# Patient Record
Sex: FEMALE | Race: WHITE | NOT HISPANIC OR LATINO | Employment: FULL TIME | URBAN - METROPOLITAN AREA
[De-identification: names, ages, dates, MRNs, and addresses within clinical notes are randomized per-mention and may not be internally consistent; named-entity substitution may affect disease eponyms.]

---

## 2018-05-09 ENCOUNTER — OFFICE VISIT CONVERTED (OUTPATIENT)
Dept: INTERNAL MEDICINE | Facility: CLINIC | Age: 43
End: 2018-05-09
Attending: PHYSICIAN ASSISTANT

## 2018-05-17 ENCOUNTER — OFFICE VISIT CONVERTED (OUTPATIENT)
Dept: INTERNAL MEDICINE | Facility: CLINIC | Age: 43
End: 2018-05-17
Attending: INTERNAL MEDICINE

## 2019-02-25 ENCOUNTER — OFFICE VISIT CONVERTED (OUTPATIENT)
Dept: INTERNAL MEDICINE | Facility: CLINIC | Age: 44
End: 2019-02-25
Attending: PHYSICIAN ASSISTANT

## 2019-10-18 ENCOUNTER — OFFICE VISIT CONVERTED (OUTPATIENT)
Dept: INTERNAL MEDICINE | Facility: CLINIC | Age: 44
End: 2019-10-18
Attending: INTERNAL MEDICINE

## 2020-12-02 ENCOUNTER — OFFICE VISIT CONVERTED (OUTPATIENT)
Dept: INTERNAL MEDICINE | Facility: CLINIC | Age: 45
End: 2020-12-02
Attending: STUDENT IN AN ORGANIZED HEALTH CARE EDUCATION/TRAINING PROGRAM

## 2021-05-13 NOTE — PROGRESS NOTES
"   Progress Note      Patient Name: Deepali Carranza   Patient ID: [id number]   Sex: Female   YOB: 1975    Primary Care Provider: Miranda ROY    Visit Date: December 2, 2020    Provider: Cari Alcocer MD   Location: [facility]   Location Address: [address] [place] [address], [address]  [place], [place]  [id number]   Location Phone: [phone]          Chief Complaint  · Cough  · Congestion  · Sore Throat       History Of Present Illness  Deepali Carranza is a 45 year old female who presents for evaluation and treatment of:      Cough, congestion and sore throat:     Started saturday with a sore throat, then led into the next few days with congestions and draining, coughing, sneezing and loosing her voice. Cough is non-productive. Nasal drainage \"green and nasty\". Denies any fevers. Unsure of chills as she is always cold. Denies nausea or vomiting. Endorses dizziness, no falls. Endorses HA, throbbing over mid-face. Denies myalgias. Has been taking dayquill and nightquill at home.       She never gets the flu shot.   Daughter is Nursing student and works as a CNA and has been around covid exposure and lives with mom. Daughter is having a few of the symptoms that pt is having and getting tested today.         Past Medical History  Disease Name Date Onset Notes   Allergic rhinitis 01/29/2015 --    Anxiety --  --    DDD (degenerative disc disease), cervical 07/13/2015 --    Depression --  --    Hypertension, essential --  --    Insomnia, unspecified 05/10/2016 --    Migraine Headaches --  --    Palpitations --  --    Radiculitis 07/13/2015 --    Spondylosis 07/13/2015 --          Medication List  Name Date Started Instructions   Flonase Allergy Relief 50 mcg/actuation nasal spray,suspension 12/02/2020 spray 1 - 2 sprays (50 - 100 mcg) in each nostril by intranasal route once daily as needed for 90 days   omeprazole 40 mg oral capsule,delayed release(/EC) 10/18/2019 take 1 capsule " "(40 mg) by oral route once daily before a meal   OMEPRAZOLE 40MG CAPSULES 2020 TAKE 1 CAPSULE(40 MG) BY MOUTH EVERY DAY BEFORE A MEAL   Prozac 40 mg oral capsule 10/18/2019 take 1 capsule (40 mg) by oral route once daily in the evening for 30 days         Allergy List  Allergen Name Date Reaction Notes   NO KNOWN DRUG ALLERGIES --  --  --        Allergies Reconciled  Family Medical History  Disease Name Relative/Age Notes   Larynx Neoplasm, Malignant  --    Stroke  --    Heart Disease  --    Hypertension  --    Diabetes Mellitus, Type II  --          Reproductive History  Menstrual   Last Menstrual Period: 04/10/2014   Pregnancy Summary   Total Pregnancies: 2 Full Term: 2 Premature: 0   Ab Induced: 0 Ab Spontaneous: 0 Ectopics: 0   Multiples: 0 Livin         Social History  Finding Status Start/Stop Quantity Notes   Alcohol Current some day --/-- 1 glass wine/wk --    Tobacco Current every day 39/-- 1/2 ppd --          Review of Systems  · Constitutional  o Denies  o : fever, fatigue, weight loss, weight gain  · HENT  o Admits  o : lightheadedness, sinus pain, nasal congestion, nasal discharge, sore throat, ear fullness  · Cardiovascular  o Denies  o : lower extremity edema, claudication, chest pressure, palpitations  · Respiratory  o Denies  o : shortness of breath, wheezing, cough, hemoptysis, dyspnea on exertion  · Gastrointestinal  o Denies  o : nausea, vomiting, diarrhea, constipation, abdominal pain  · Integument  o Denies  o : rash      Vitals  Date Time BP Position Site L\R Cuff Size HR RR TEMP (F) WT  HT  BMI kg/m2 BSA m2 O2 Sat FR L/min FiO2 HC       2019 03:35 /86 Sitting    71 - R 16 97.7 149lbs 2oz 5'  0.75\" 28.41 1.7 97 %      10/18/2019 02:49 /80 Sitting    74 - R 16 98.2 146lbs 6oz 5'  0.75\" 27.89 1.69 99 %  21%    2020 11:13 /90 Sitting    86 - R  97.6 144lbs 0oz 5'  0.75\" 27.43 1.67 98 %  21%          Physical Examination  · Constitutional  o Appearance  o : " no acute distress, well-nourished  · Head and Face  o Head  o :   § Inspection  § : atraumatic, normocephalic  · Eyes  o Eyes  o : extraocular movements intact, no scleral icterus, no conjunctival injection  · Ears, Nose, Mouth and Throat  o Ears  o :   § External Ears  § : normal  § Otoscopic Examination  § : bilateral serous otitis media.  o Nose  o :   § Intranasal Exam  § : nasal mucosa inflammation present, nasal passages patent, maxillary sinus tender to exam by percussion on the left, right maxillary and frontal sinus nontender  o Oral Cavity  o :   § Oral Mucosa  § : moist mucous membranes  o Throat  o :   § Oropharynx  § : no inflammation or lesions present, tonsils within normal limits, mild erythema over post pharynx  · Respiratory  o Respiratory Effort  o : breathing comfortably, symmetric chest rise  o Auscultation of Lungs  o : clear to asculatation bilaterally, no wheezes, rales, or rhonchii  · Cardiovascular  o Heart  o :   § Auscultation of Heart  § : regular rate and rhythm, no murmurs, rubs, or gallops  · Neurologic  o Mental Status Examination  o :   § Orientation  § : grossly oriented to person, place and time  o Gait and Station  o :   § Gait Screening  § : normal gait  · Psychiatric  o General  o : normal mood and affect              Assessment  · Cough     786.2/R05  Acute. Secondary to viral illness. Low suspicion for influenza given hx above. Flu screen indeterminate in clinic. Patient declined flu vaccine.    Patient with high risk of exposure to covid given living arrangements as noted above. However symptoms at this time are more concerning for viral sinusitis. Low threshold to screen should symptoms persist or worsen.   · Hypertension, essential     401.9/I10  hx of in the past. Has not been on any meds over the past couple years. BP elevated in office today, prior BP wnl. Encourage patient to check BP at home over the next couple days.   · Sore throat     462/J02.9  Acute and  improving. Strep swab negative in clinic. Most likely secondary to viral pharyngitis. Supportive care.   · Acute sinusitis     461.9/J01.90  Most likely viral in nature. Supportive care with Flonase and Loratadine. Reasons to return to clinic for add' evaluation discussed.     Problems Reconciled  Plan  · Orders  o ACO-39: Current medications updated and reviewed (, 1159F) - - 12/02/2020  · Medications  o Flonase Allergy Relief 50 mcg/actuation nasal spray,suspension   SIG: spray 1 - 2 sprays (50 - 100 mcg) in each nostril by intranasal route once daily for 14 days   DISP: (1) Package with 0 refills  Prescribed on 12/02/2020     o loratadine 10 mg oral tablet   SIG: take 1 tablet (10 mg) by oral route once daily for 30 days   DISP: (30) Tablet with 2 refills  Prescribed on 12/02/2020     o Medications have been Reconciled  o Transition of Care or Provider Policy  · Instructions  o Patient was educated/instructed on their diagnosis, treatment and medications prior to discharge from the clinic today.  · Disposition  o Call or Return if symptoms worsen or persist.            Electronically Signed by: Cari Alcocer MD -Author on December 2, 2020 09:52:01 PM

## 2021-05-14 VITALS
BODY MASS INDEX: 28.27 KG/M2 | HEIGHT: 60 IN | WEIGHT: 144 LBS | SYSTOLIC BLOOD PRESSURE: 142 MMHG | HEART RATE: 86 BPM | OXYGEN SATURATION: 98 % | TEMPERATURE: 97.6 F | DIASTOLIC BLOOD PRESSURE: 90 MMHG

## 2021-05-15 VITALS
RESPIRATION RATE: 16 BRPM | WEIGHT: 146.37 LBS | HEIGHT: 60 IN | DIASTOLIC BLOOD PRESSURE: 80 MMHG | SYSTOLIC BLOOD PRESSURE: 126 MMHG | HEART RATE: 74 BPM | BODY MASS INDEX: 28.74 KG/M2 | TEMPERATURE: 98.2 F | OXYGEN SATURATION: 99 %

## 2021-05-16 VITALS
OXYGEN SATURATION: 98 % | RESPIRATION RATE: 16 BRPM | DIASTOLIC BLOOD PRESSURE: 80 MMHG | HEART RATE: 103 BPM | SYSTOLIC BLOOD PRESSURE: 132 MMHG | WEIGHT: 159.37 LBS | BODY MASS INDEX: 31.29 KG/M2 | TEMPERATURE: 97.2 F | HEIGHT: 60 IN

## 2021-05-16 VITALS
WEIGHT: 161 LBS | RESPIRATION RATE: 16 BRPM | HEIGHT: 60 IN | TEMPERATURE: 98 F | BODY MASS INDEX: 31.61 KG/M2 | HEART RATE: 116 BPM | SYSTOLIC BLOOD PRESSURE: 152 MMHG | OXYGEN SATURATION: 99 % | DIASTOLIC BLOOD PRESSURE: 88 MMHG

## 2021-05-16 VITALS
DIASTOLIC BLOOD PRESSURE: 86 MMHG | TEMPERATURE: 97.7 F | RESPIRATION RATE: 16 BRPM | OXYGEN SATURATION: 97 % | BODY MASS INDEX: 29.28 KG/M2 | WEIGHT: 149.12 LBS | HEART RATE: 71 BPM | HEIGHT: 60 IN | SYSTOLIC BLOOD PRESSURE: 126 MMHG

## 2022-09-30 ENCOUNTER — OFFICE VISIT (OUTPATIENT)
Dept: INTERNAL MEDICINE | Facility: CLINIC | Age: 47
End: 2022-09-30

## 2022-09-30 VITALS
WEIGHT: 142.6 LBS | TEMPERATURE: 96.7 F | OXYGEN SATURATION: 97 % | BODY MASS INDEX: 28 KG/M2 | RESPIRATION RATE: 18 BRPM | HEIGHT: 60 IN | HEART RATE: 91 BPM | SYSTOLIC BLOOD PRESSURE: 128 MMHG | DIASTOLIC BLOOD PRESSURE: 78 MMHG

## 2022-09-30 DIAGNOSIS — N30.00 ACUTE CYSTITIS WITHOUT HEMATURIA: Primary | ICD-10-CM

## 2022-09-30 DIAGNOSIS — N39.46 MIXED INCONTINENCE: ICD-10-CM

## 2022-09-30 DIAGNOSIS — R30.0 DYSURIA: ICD-10-CM

## 2022-09-30 LAB
BACTERIA UR QL AUTO: ABNORMAL /HPF
BILIRUB UR QL STRIP: NEGATIVE
CLARITY UR: ABNORMAL
COLOR UR: ABNORMAL
GLUCOSE UR STRIP-MCNC: ABNORMAL MG/DL
HGB UR QL STRIP.AUTO: NEGATIVE
HYALINE CASTS UR QL AUTO: ABNORMAL /LPF
KETONES UR QL STRIP: NEGATIVE
LEUKOCYTE ESTERASE UR QL STRIP.AUTO: NEGATIVE
NITRITE UR QL STRIP: POSITIVE
PH UR STRIP.AUTO: 6.5 [PH] (ref 5–8)
PROT UR QL STRIP: ABNORMAL
RBC # UR STRIP: ABNORMAL /HPF
REF LAB TEST METHOD: ABNORMAL
SP GR UR STRIP: 1.01 (ref 1–1.03)
SQUAMOUS #/AREA URNS HPF: ABNORMAL /HPF
UNIDENT CRYS URNS QL MICRO: ABNORMAL /HPF
UROBILINOGEN UR QL STRIP: ABNORMAL
WBC # UR STRIP: ABNORMAL /HPF

## 2022-09-30 PROCEDURE — 99213 OFFICE O/P EST LOW 20 MIN: CPT | Performed by: NURSE PRACTITIONER

## 2022-09-30 PROCEDURE — 81001 URINALYSIS AUTO W/SCOPE: CPT | Performed by: NURSE PRACTITIONER

## 2022-09-30 PROCEDURE — 87086 URINE CULTURE/COLONY COUNT: CPT | Performed by: NURSE PRACTITIONER

## 2022-09-30 RX ORDER — SULFAMETHOXAZOLE AND TRIMETHOPRIM 800; 160 MG/1; MG/1
1 TABLET ORAL 2 TIMES DAILY
Qty: 14 TABLET | Refills: 0 | Status: SHIPPED | OUTPATIENT
Start: 2022-09-30 | End: 2022-10-07

## 2022-09-30 NOTE — PROGRESS NOTES
"Chief Complaint  Dysuria (5 days), Urinary Frequency (5 days), and Urinary Urgency (5 days)    Subjective          Deepali Carranza presents to [facility]  History of Present Illness  Dysuria x5 days  Azo today  uti--Occurring about once yearly  Denies fever, chills, body aches    Occasional incontinence worsening over the last few years  Objective   Vital Signs:   /78 (BP Location: Left arm, Patient Position: Sitting, Cuff Size: Adult)   Pulse 91   Temp 96.7 °F (35.9 °C)   Resp 18   Ht 152.4 cm (60\")   Wt 64.7 kg (142 lb 9.6 oz)   SpO2 97%   BMI 27.85 kg/m²     Physical Exam  Vitals and nursing note reviewed.   Constitutional:       General: She is not in acute distress.     Appearance: Normal appearance.   HENT:      Head: Normocephalic and atraumatic.      Right Ear: External ear normal.      Left Ear: External ear normal.      Nose: Nose normal.      Mouth/Throat:      Mouth: Mucous membranes are moist.   Eyes:      Conjunctiva/sclera: Conjunctivae normal.   Cardiovascular:      Rate and Rhythm: Normal rate and regular rhythm.      Pulses: Normal pulses.      Heart sounds: Normal heart sounds. No murmur heard.    No friction rub. No gallop.   Pulmonary:      Effort: Pulmonary effort is normal. No respiratory distress.      Breath sounds: No wheezing, rhonchi or rales.   Abdominal:      Tenderness: There is no abdominal tenderness. There is no right CVA tenderness, left CVA tenderness or guarding.   Musculoskeletal:      Cervical back: Neck supple.   Skin:     General: Skin is warm and dry.   Neurological:      General: No focal deficit present.      Mental Status: She is alert and oriented to person, place, and time.   Psychiatric:         Mood and Affect: Mood normal.         Behavior: Behavior normal.        Result Review :          Procedures      Assessment and Plan    Diagnoses and all orders for this visit:    1. Acute cystitis without " hematuria (Primary)  Comments:  treating with bactrim. sending for micro and cx    2. Dysuria  -     Urinalysis With Culture If Indicated - Urine, Clean Catch  -     Urinalysis, Microscopic Only - Urine, Clean Catch  -     Urine Culture - Urine, Urine, Clean Catch    3. Mixed incontinence  Comments:  discussed pelvic floor therapy. she will begin with kegel's and call for referral if she would like to due pelvic floor therapy    Other orders  -     sulfamethoxazole-trimethoprim (Bactrim DS) 800-160 MG per tablet; Take 1 tablet by mouth 2 (Two) Times a Day for 7 days.  Dispense: 14 tablet; Refill: 0              Follow Up   Return if symptoms worsen or fail to improve.  Patient was given instructions and counseling regarding her condition or for health maintenance advice. Please see specific information pulled into the AVS if appropriate.

## 2022-10-01 LAB — BACTERIA SPEC AEROBE CULT: ABNORMAL

## 2023-07-11 PROBLEM — R00.2 PALPITATIONS: Status: ACTIVE | Noted: 2023-07-11

## 2023-07-11 PROBLEM — I10 HYPERTENSION, ESSENTIAL: Status: ACTIVE | Noted: 2023-07-11

## 2023-07-11 PROBLEM — G43.009 MIGRAINE WITHOUT AURA: Status: ACTIVE | Noted: 2023-07-11

## 2023-07-11 PROBLEM — R21 RASH: Status: ACTIVE | Noted: 2023-07-11

## 2023-07-11 PROBLEM — F32.A DEPRESSION: Status: ACTIVE | Noted: 2023-07-11

## 2023-07-11 PROBLEM — F41.9 ANXIETY: Status: ACTIVE | Noted: 2023-07-11

## 2023-09-25 ENCOUNTER — DOCUMENTATION (OUTPATIENT)
Dept: INTERNAL MEDICINE | Facility: CLINIC | Age: 48
End: 2023-09-25
Payer: COMMERCIAL

## 2023-09-25 ENCOUNTER — OFFICE VISIT (OUTPATIENT)
Dept: INTERNAL MEDICINE | Facility: CLINIC | Age: 48
End: 2023-09-25

## 2023-09-25 VITALS
WEIGHT: 117 LBS | HEART RATE: 68 BPM | OXYGEN SATURATION: 98 % | BODY MASS INDEX: 22.85 KG/M2 | SYSTOLIC BLOOD PRESSURE: 123 MMHG | DIASTOLIC BLOOD PRESSURE: 81 MMHG | TEMPERATURE: 97.7 F

## 2023-09-25 DIAGNOSIS — R53.83 FATIGUE, UNSPECIFIED TYPE: ICD-10-CM

## 2023-09-25 DIAGNOSIS — Z12.31 VISIT FOR SCREENING MAMMOGRAM: ICD-10-CM

## 2023-09-25 DIAGNOSIS — Z12.11 SCREENING FOR COLON CANCER: ICD-10-CM

## 2023-09-25 DIAGNOSIS — Z00.00 ANNUAL PHYSICAL EXAM: Primary | ICD-10-CM

## 2023-09-25 DIAGNOSIS — L81.9 DISCOLORATION OF SKIN OF FOOT: ICD-10-CM

## 2023-09-25 DIAGNOSIS — R25.2 LEG CRAMPING: Primary | ICD-10-CM

## 2023-09-25 DIAGNOSIS — R25.2 LEG CRAMPING: ICD-10-CM

## 2023-09-25 DIAGNOSIS — E87.6 HYPOKALEMIA: ICD-10-CM

## 2023-09-25 DIAGNOSIS — R60.0 LOWER EXTREMITY EDEMA: ICD-10-CM

## 2023-09-25 LAB
25(OH)D3 SERPL-MCNC: 67.1 NG/ML (ref 30–100)
ALBUMIN SERPL-MCNC: 4.4 G/DL (ref 3.5–5.2)
ALBUMIN/GLOB SERPL: 1.6 G/DL
ALP SERPL-CCNC: 39 U/L (ref 39–117)
ALT SERPL W P-5'-P-CCNC: 18 U/L (ref 1–33)
ANION GAP SERPL CALCULATED.3IONS-SCNC: 12.2 MMOL/L (ref 5–15)
AST SERPL-CCNC: 21 U/L (ref 1–32)
BASOPHILS # BLD AUTO: 0.04 10*3/MM3 (ref 0–0.2)
BASOPHILS NFR BLD AUTO: 0.8 % (ref 0–1.5)
BILIRUB SERPL-MCNC: 0.5 MG/DL (ref 0–1.2)
BUN SERPL-MCNC: 10 MG/DL (ref 6–20)
BUN/CREAT SERPL: 11.4 (ref 7–25)
CALCIUM SPEC-SCNC: 9.6 MG/DL (ref 8.6–10.5)
CHLORIDE SERPL-SCNC: 100 MMOL/L (ref 98–107)
CHOLEST SERPL-MCNC: 197 MG/DL (ref 0–200)
CO2 SERPL-SCNC: 27.8 MMOL/L (ref 22–29)
CREAT SERPL-MCNC: 0.88 MG/DL (ref 0.57–1)
DEPRECATED RDW RBC AUTO: 38.4 FL (ref 37–54)
EGFRCR SERPLBLD CKD-EPI 2021: 81.2 ML/MIN/1.73
EOSINOPHIL # BLD AUTO: 0.14 10*3/MM3 (ref 0–0.4)
EOSINOPHIL NFR BLD AUTO: 2.9 % (ref 0.3–6.2)
ERYTHROCYTE [DISTWIDTH] IN BLOOD BY AUTOMATED COUNT: 11.8 % (ref 12.3–15.4)
FERRITIN SERPL-MCNC: 89.9 NG/ML (ref 13–150)
FOLATE SERPL-MCNC: 6.39 NG/ML (ref 4.78–24.2)
GLOBULIN UR ELPH-MCNC: 2.7 GM/DL
GLUCOSE SERPL-MCNC: 88 MG/DL (ref 65–99)
HCT VFR BLD AUTO: 38.9 % (ref 34–46.6)
HDLC SERPL-MCNC: 73 MG/DL (ref 40–60)
HGB BLD-MCNC: 13.5 G/DL (ref 12–15.9)
IMM GRANULOCYTES # BLD AUTO: 0.01 10*3/MM3 (ref 0–0.05)
IMM GRANULOCYTES NFR BLD AUTO: 0.2 % (ref 0–0.5)
IRON 24H UR-MRATE: 108 MCG/DL (ref 37–145)
IRON SATN MFR SERPL: 23 % (ref 20–50)
LDLC SERPL CALC-MCNC: 108 MG/DL (ref 0–100)
LDLC/HDLC SERPL: 1.46 {RATIO}
LYMPHOCYTES # BLD AUTO: 1.29 10*3/MM3 (ref 0.7–3.1)
LYMPHOCYTES NFR BLD AUTO: 26.8 % (ref 19.6–45.3)
MAGNESIUM SERPL-MCNC: 1.9 MG/DL (ref 1.6–2.6)
MCH RBC QN AUTO: 31.5 PG (ref 26.6–33)
MCHC RBC AUTO-ENTMCNC: 34.7 G/DL (ref 31.5–35.7)
MCV RBC AUTO: 90.9 FL (ref 79–97)
MONOCYTES # BLD AUTO: 0.36 10*3/MM3 (ref 0.1–0.9)
MONOCYTES NFR BLD AUTO: 7.5 % (ref 5–12)
NEUTROPHILS NFR BLD AUTO: 2.97 10*3/MM3 (ref 1.7–7)
NEUTROPHILS NFR BLD AUTO: 61.8 % (ref 42.7–76)
NRBC BLD AUTO-RTO: 0 /100 WBC (ref 0–0.2)
PLATELET # BLD AUTO: 381 10*3/MM3 (ref 140–450)
PMV BLD AUTO: 10.2 FL (ref 6–12)
POTASSIUM SERPL-SCNC: 2.5 MMOL/L (ref 3.5–5.2)
PROT SERPL-MCNC: 7.1 G/DL (ref 6–8.5)
RBC # BLD AUTO: 4.28 10*6/MM3 (ref 3.77–5.28)
SODIUM SERPL-SCNC: 140 MMOL/L (ref 136–145)
T4 FREE SERPL-MCNC: 1.38 NG/DL (ref 0.93–1.7)
TIBC SERPL-MCNC: 460 MCG/DL (ref 298–536)
TRANSFERRIN SERPL-MCNC: 309 MG/DL (ref 200–360)
TRIGL SERPL-MCNC: 88 MG/DL (ref 0–150)
TSH SERPL DL<=0.05 MIU/L-ACNC: 0.83 UIU/ML (ref 0.27–4.2)
VIT B12 BLD-MCNC: 549 PG/ML (ref 211–946)
VLDLC SERPL-MCNC: 16 MG/DL (ref 5–40)
WBC NRBC COR # BLD: 4.81 10*3/MM3 (ref 3.4–10.8)

## 2023-09-25 PROCEDURE — 82306 VITAMIN D 25 HYDROXY: CPT | Performed by: NURSE PRACTITIONER

## 2023-09-25 PROCEDURE — 82746 ASSAY OF FOLIC ACID SERUM: CPT | Performed by: NURSE PRACTITIONER

## 2023-09-25 PROCEDURE — 84466 ASSAY OF TRANSFERRIN: CPT | Performed by: NURSE PRACTITIONER

## 2023-09-25 PROCEDURE — 82607 VITAMIN B-12: CPT | Performed by: NURSE PRACTITIONER

## 2023-09-25 PROCEDURE — 83735 ASSAY OF MAGNESIUM: CPT | Performed by: NURSE PRACTITIONER

## 2023-09-25 PROCEDURE — 80061 LIPID PANEL: CPT | Performed by: NURSE PRACTITIONER

## 2023-09-25 PROCEDURE — 80050 GENERAL HEALTH PANEL: CPT | Performed by: NURSE PRACTITIONER

## 2023-09-25 PROCEDURE — 83540 ASSAY OF IRON: CPT | Performed by: NURSE PRACTITIONER

## 2023-09-25 PROCEDURE — 82728 ASSAY OF FERRITIN: CPT | Performed by: NURSE PRACTITIONER

## 2023-09-25 PROCEDURE — 84439 ASSAY OF FREE THYROXINE: CPT | Performed by: NURSE PRACTITIONER

## 2023-09-25 RX ORDER — PHENTERMINE HYDROCHLORIDE 30 MG/1
30 CAPSULE ORAL EVERY MORNING
COMMUNITY

## 2023-09-25 NOTE — PROGRESS NOTES
Chief Complaint  Annual Exam (Concern with feet- tingling in feet, purple and swelling /)    Subjective         Deepali Carranza presents to [facility]  HPI     Last labs: Years   LMP: 9/2023, irregular   PAP: 2016, patient to schedule  Mammogram: 2016; Denies family history of breast cancer  Colonoscopy: Denies family history of colon cancer  Influenza vaccination: Would like   COVID19 vaccination: Up to date  Eye exam: 2023  Dental exam: Due   Smoking history: Denies   Specialists: [facility]      Annual physical exam-  Family history of stroke in paternal aunts. Denies chest pain, blurry vision, headache, leg swelling, shortness of breath, seizure activity.    Patient reports since January she has been working on exercising and weight loss.  Has noticed worsening left hip pain, will have numbness/tingling in the left leg. Admits to episodes of sciatica. States she has also noticed bilateral foot discoloration, states her feet will often turn purple when she sits or stands for significant period of time.  States when she first wakes up in the morning her feet will often be tingling and itchy.  Denies injury, shortness of breath, orthopnea.  States her left calf often feels like it needs to stretch but is not quite a cramp. Denies calf pain or swelling. States her daughter is a nurse and has been concerned about the color of her feet. She has not had labs in some time. She is currently taking phentermine for weight loss. She is wondering if she is in menopause because she has been very fatigued, often has to make herself get up and go. Admits to depression prior to losing weight but does not think this is an issue anymore. Attributes this to working at home and sitting all day.     Objective     Vitals:    09/25/23 1034   BP: 123/81   BP Location: Left arm   Pulse: 68   Temp: 97.7 °F (36.5 °C)   TempSrc: Temporal   SpO2: 98%   Weight:  53.1 kg (117 lb)      Body mass index is 22.85 kg/m².    Wt Readings from Last 3 Encounters:   09/25/23 53.1 kg (117 lb)   07/11/23 53.9 kg (118 lb 12.8 oz)   09/30/22 64.7 kg (142 lb 9.6 oz)     BP Readings from Last 3 Encounters:   09/25/23 123/81   07/11/23 120/66   09/30/22 128/78       BMI is within normal parameters. No other follow-up for BMI required.         Physical Exam  Constitutional:       Appearance: Normal appearance.   HENT:      Head: Normocephalic and atraumatic.      Right Ear: Tympanic membrane normal.      Left Ear: Tympanic membrane normal.      Nose: Nose normal.      Mouth/Throat:      Mouth: Mucous membranes are moist.      Pharynx: Oropharynx is clear.   Eyes:      Extraocular Movements: Extraocular movements intact.      Conjunctiva/sclera: Conjunctivae normal.      Pupils: Pupils are equal, round, and reactive to light.   Neck:      Thyroid: No thyroid mass, thyromegaly or thyroid tenderness.   Cardiovascular:      Rate and Rhythm: Normal rate and regular rhythm.      Heart sounds: Normal heart sounds.   Pulmonary:      Effort: Pulmonary effort is normal.      Breath sounds: Normal breath sounds.   Skin:     General: Skin is warm and dry.   Neurological:      General: No focal deficit present.      Mental Status: She is alert and oriented to person, place, and time.   Psychiatric:         Mood and Affect: Mood normal.         Behavior: Behavior normal.         Thought Content: Thought content normal.        Result Review :   The following data was reviewed by: KIRILL Cotto on 09/25/2023:      Procedures    Assessment and Plan   Diagnoses and all orders for this visit:    1. Annual physical exam (Primary)  Assessment & Plan:  Basic labs in clinic today. Encouraged routine dental and eye exams. Discussed age appropriate immunizations, screenings. Age appropriate handout provided, including information on nutrition and physical activity.      Orders:  -     Comprehensive Metabolic  Panel  -     CBC & Differential  -     TSH  -     Lipid Panel  -     T4, Free    2. Discoloration of skin of foot  Assessment & Plan:  Not apparent on exam. Pulses normal bilateral feet. Due to persistent symptoms will schedule for FIFI to evaluate circulation. Routine labs today. Will determine further plan of care based on results. She should notify clinic if symptoms worsen or persist.     Orders:  -     US Ankle / Brachial Indices Extremity Complete; Future    3. Lower extremity edema  -     US Ankle / Brachial Indices Extremity Complete; Future    4. Leg cramping    5. Fatigue, unspecified type  Comments:  Labs today. Scheduling for routine screenings.  Orders:  -     TSH  -     T4, Free  -     Magnesium  -     Iron Profile  -     Ferritin  -     Vitamin D,25-Hydroxy  -     Vitamin B12 & Folate    6. Visit for screening mammogram  Comments:  Mammogram ordered.  Orders:  -     Mammo Screening Digital Tomosynthesis Bilateral With CAD; Future    7. Screening for colon cancer  Comments:  Cologuard ordered. Patient aware positive results would warrant further evaluation with colonoscopy.  Orders:  -     Cologuard - Stool, Per Rectum; Future    Other orders  -     Fluzone >6 Months ([id number])          Follow Up   Return if symptoms worsen or fail to improve.  Patient was given instructions and counseling regarding her condition or for health maintenance advice. Please see specific information pulled into the AVS if appropriate.

## 2023-09-25 NOTE — ASSESSMENT & PLAN NOTE
Not apparent on exam. Pulses normal bilateral feet. Due to persistent symptoms will schedule for FIFI to evaluate circulation. Routine labs today. Will determine further plan of care based on results. She should notify clinic if symptoms worsen or persist.

## 2023-09-25 NOTE — PROGRESS NOTES
Called with a critically low potassium called patient she is asymptomatic therefore likely lab error. However told her to hold her phentermine and she will have labs repeated first thing tomorrow. Order placed for BMP

## 2023-09-26 ENCOUNTER — CLINICAL SUPPORT (OUTPATIENT)
Dept: INTERNAL MEDICINE | Facility: CLINIC | Age: 48
End: 2023-09-26
Payer: COMMERCIAL

## 2023-09-26 DIAGNOSIS — R60.0 LOWER EXTREMITY EDEMA: ICD-10-CM

## 2023-09-26 DIAGNOSIS — L81.9 DISCOLORATION OF SKIN OF FOOT: Primary | ICD-10-CM

## 2023-09-26 DIAGNOSIS — Z01.89 ENCOUNTER FOR LABORATORY TEST: Primary | ICD-10-CM

## 2023-09-26 LAB
ANION GAP SERPL CALCULATED.3IONS-SCNC: 10.9 MMOL/L (ref 5–15)
BUN SERPL-MCNC: 12 MG/DL (ref 6–20)
BUN/CREAT SERPL: 14 (ref 7–25)
CALCIUM SPEC-SCNC: 9.4 MG/DL (ref 8.6–10.5)
CHLORIDE SERPL-SCNC: 100 MMOL/L (ref 98–107)
CO2 SERPL-SCNC: 29.1 MMOL/L (ref 22–29)
CREAT SERPL-MCNC: 0.86 MG/DL (ref 0.57–1)
EGFRCR SERPLBLD CKD-EPI 2021: 83.5 ML/MIN/1.73
GLUCOSE SERPL-MCNC: 85 MG/DL (ref 65–99)
POTASSIUM SERPL-SCNC: 2.4 MMOL/L (ref 3.5–5.2)
SODIUM SERPL-SCNC: 140 MMOL/L (ref 136–145)

## 2023-09-26 PROCEDURE — 36415 COLL VENOUS BLD VENIPUNCTURE: CPT | Performed by: INTERNAL MEDICINE

## 2023-09-26 PROCEDURE — 80048 BASIC METABOLIC PNL TOTAL CA: CPT | Performed by: INTERNAL MEDICINE

## 2023-09-26 NOTE — PROGRESS NOTES
Venipuncture Blood Specimen Collection  Venipuncture performed in [facility] by Deepali Peres RN with good hemostasis. Patient tolerated the procedure well without complications.   09/26/23   Deepali Peres RN

## 2023-09-27 ENCOUNTER — DOCUMENTATION (OUTPATIENT)
Dept: INTERNAL MEDICINE | Facility: CLINIC | Age: 48
End: 2023-09-27
Payer: COMMERCIAL

## 2023-09-27 DIAGNOSIS — E87.6 HYPOKALEMIA: Primary | ICD-10-CM

## 2023-09-27 RX ORDER — POTASSIUM CHLORIDE 20 MEQ/1
TABLET, EXTENDED RELEASE ORAL
Qty: 180 TABLET | OUTPATIENT
Start: 2023-09-27

## 2023-09-27 RX ORDER — POTASSIUM CHLORIDE 20 MEQ/1
20 TABLET, EXTENDED RELEASE ORAL 2 TIMES DAILY
Qty: 60 TABLET | Refills: 1 | Status: SHIPPED | OUTPATIENT
Start: 2023-09-27

## 2023-09-27 NOTE — PROGRESS NOTES
Returning lab call re: critical results. Potassium of 2.4 (2.5 yesterday) with normal kidney function and Mg. Attempted to call patient, call went to voicemail. Sending info to pcp.

## 2023-10-04 ENCOUNTER — CLINICAL SUPPORT (OUTPATIENT)
Dept: INTERNAL MEDICINE | Facility: CLINIC | Age: 48
End: 2023-10-04
Payer: COMMERCIAL

## 2023-10-04 DIAGNOSIS — E87.6 HYPOKALEMIA: ICD-10-CM

## 2023-10-04 LAB
ANION GAP SERPL CALCULATED.3IONS-SCNC: 10.6 MMOL/L (ref 5–15)
BUN SERPL-MCNC: 10 MG/DL (ref 6–20)
BUN/CREAT SERPL: 12.8 (ref 7–25)
CALCIUM SPEC-SCNC: 8.9 MG/DL (ref 8.6–10.5)
CHLORIDE SERPL-SCNC: 105 MMOL/L (ref 98–107)
CO2 SERPL-SCNC: 25.4 MMOL/L (ref 22–29)
CREAT SERPL-MCNC: 0.78 MG/DL (ref 0.57–1)
EGFRCR SERPLBLD CKD-EPI 2021: 93.8 ML/MIN/1.73
GLUCOSE SERPL-MCNC: 73 MG/DL (ref 65–99)
POTASSIUM SERPL-SCNC: 3.6 MMOL/L (ref 3.5–5.2)
SODIUM SERPL-SCNC: 141 MMOL/L (ref 136–145)

## 2023-10-04 PROCEDURE — 36415 COLL VENOUS BLD VENIPUNCTURE: CPT | Performed by: INTERNAL MEDICINE

## 2023-10-04 PROCEDURE — 80048 BASIC METABOLIC PNL TOTAL CA: CPT | Performed by: NURSE PRACTITIONER

## 2023-10-04 NOTE — PROGRESS NOTES
Venipuncture Blood Specimen Collection  Venipuncture performed in [facility] by Deepali Peres RN with good hemostasis. Patient tolerated the procedure well without complications.   10/04/23   Deepali Peres RN

## 2023-10-31 ENCOUNTER — HOSPITAL ENCOUNTER (OUTPATIENT)
Dept: CARDIOLOGY | Facility: HOSPITAL | Age: 48
Discharge: HOME | End: 2023-10-31
Admitting: NURSE PRACTITIONER
Payer: COMMERCIAL

## 2023-10-31 DIAGNOSIS — R60.0 LOWER EXTREMITY EDEMA: ICD-10-CM

## 2023-10-31 DIAGNOSIS — L81.9 DISCOLORATION OF SKIN OF FOOT: ICD-10-CM

## 2023-10-31 LAB
BH CV LOWER ARTERIAL LEFT ABI RATIO: 1.13
BH CV LOWER ARTERIAL LEFT DORSALIS PEDIS SYS MAX: 137
BH CV LOWER ARTERIAL LEFT GREAT TOE SYS MAX: 116
BH CV LOWER ARTERIAL LEFT POST TIBIAL SYS MAX: 140
BH CV LOWER ARTERIAL LEFT TBI RATIO: 0.94
BH CV LOWER ARTERIAL RIGHT ABI RATIO: 1.15
BH CV LOWER ARTERIAL RIGHT DORSALIS PEDIS SYS MAX: 139
BH CV LOWER ARTERIAL RIGHT GREAT TOE SYS MAX: 124
BH CV LOWER ARTERIAL RIGHT POST TIBIAL SYS MAX: 142
BH CV LOWER ARTERIAL RIGHT TBI RATIO: 1
UPPER ARTERIAL LEFT ARM BRACHIAL SYS MAX: 124
UPPER ARTERIAL RIGHT ARM BRACHIAL SYS MAX: 122

## 2023-10-31 PROCEDURE — 93922 UPR/L XTREMITY ART 2 LEVELS: CPT

## 2023-10-31 PROCEDURE — 93922 UPR/L XTREMITY ART 2 LEVELS: CPT | Performed by: SURGERY

## 2023-12-15 ENCOUNTER — HOSPITAL ENCOUNTER (OUTPATIENT)
Dept: MAMMOGRAPHY | Facility: HOSPITAL | Age: 48
Discharge: HOME | End: 2023-12-15
Admitting: NURSE PRACTITIONER
Payer: COMMERCIAL

## 2023-12-15 DIAGNOSIS — Z12.31 VISIT FOR SCREENING MAMMOGRAM: ICD-10-CM

## 2023-12-15 PROCEDURE — 77067 SCR MAMMO BI INCL CAD: CPT

## 2023-12-15 PROCEDURE — 77063 BREAST TOMOSYNTHESIS BI: CPT

## 2024-09-30 ENCOUNTER — OFFICE VISIT (OUTPATIENT)
Dept: INTERNAL MEDICINE | Facility: CLINIC | Age: 49
End: 2024-09-30
Payer: COMMERCIAL

## 2024-09-30 VITALS
BODY MASS INDEX: 21.6 KG/M2 | HEART RATE: 76 BPM | WEIGHT: 110 LBS | HEIGHT: 60 IN | OXYGEN SATURATION: 98 % | TEMPERATURE: 97.9 F | DIASTOLIC BLOOD PRESSURE: 70 MMHG | SYSTOLIC BLOOD PRESSURE: 122 MMHG

## 2024-09-30 DIAGNOSIS — R30.0 DYSURIA: Primary | ICD-10-CM

## 2024-09-30 LAB
BACTERIA UR QL AUTO: ABNORMAL /HPF
BILIRUB UR QL STRIP: NEGATIVE
CLARITY UR: CLEAR
COLOR UR: YELLOW
GLUCOSE UR STRIP-MCNC: NEGATIVE MG/DL
HGB UR QL STRIP.AUTO: NEGATIVE
HYALINE CASTS UR QL AUTO: ABNORMAL /LPF
KETONES UR QL STRIP: NEGATIVE
LEUKOCYTE ESTERASE UR QL STRIP.AUTO: NEGATIVE
NITRITE UR QL STRIP: POSITIVE
PH UR STRIP.AUTO: 7 [PH] (ref 5–8)
PROT UR QL STRIP: NEGATIVE
RBC # UR STRIP: ABNORMAL /HPF
REF LAB TEST METHOD: ABNORMAL
SP GR UR STRIP: 1.01 (ref 1–1.03)
SQUAMOUS #/AREA URNS HPF: ABNORMAL /HPF
UROBILINOGEN UR QL STRIP: ABNORMAL
WBC # UR STRIP: ABNORMAL /HPF

## 2024-09-30 PROCEDURE — 87086 URINE CULTURE/COLONY COUNT: CPT | Performed by: PHYSICIAN ASSISTANT

## 2024-09-30 PROCEDURE — 99213 OFFICE O/P EST LOW 20 MIN: CPT | Performed by: PHYSICIAN ASSISTANT

## 2024-09-30 PROCEDURE — 81001 URINALYSIS AUTO W/SCOPE: CPT | Performed by: PHYSICIAN ASSISTANT

## 2024-09-30 RX ORDER — SULFAMETHOXAZOLE/TRIMETHOPRIM 800-160 MG
1 TABLET ORAL 2 TIMES DAILY
Qty: 20 TABLET | Refills: 0 | Status: SHIPPED | OUTPATIENT
Start: 2024-09-30 | End: 2024-10-10

## 2024-09-30 NOTE — ASSESSMENT & PLAN NOTE
Nitrite + in office on urinalysis, will treat empirically for UTI with Bactrim and send urine for culture.  If symptoms persist or worsen, especially if patient develops fevers, hematuria or low back/pelvic pain.  Call for any questions or concerns.

## 2024-09-30 NOTE — PROGRESS NOTES
"Chief Complaint  Urinary Tract Infection (Started Wednesday of last week /Pt has burning pt has been taking uristat and seems to be helping but she wants to be sure )    Subjective        Deepali Carranza presents to [facility]  History of Present Illness    Dysuria: started around 9/25. Initially was having dysuria, frequency, and urgency. She has been taking Uristat and her symptoms have improved. No fevers or hematuria.  Initially she had some lower back pain but after pushing fluids that has resolved.    Objective   Vital Signs:  /70   Pulse 76   Temp 97.9 °F (36.6 °C)   Ht 152.4 cm (60\")   Wt 49.9 kg (110 lb)   SpO2 98%   BMI 21.48 kg/m²   Estimated body mass index is 21.48 kg/m² as calculated from the following:    Height as of this encounter: 152.4 cm (60\").    Weight as of this encounter: 49.9 kg (110 lb).    BMI is within normal parameters. No other follow-up for BMI required.      Physical Exam  Vitals reviewed.   Constitutional:       Appearance: Normal appearance. She is well-developed.   HENT:      Head: Normocephalic and atraumatic.   Eyes:      Conjunctiva/sclera: Conjunctivae normal.      Pupils: Pupils are equal, round, and reactive to light.   Cardiovascular:      Rate and Rhythm: Normal rate and regular rhythm.      Heart sounds: No murmur heard.     No friction rub. No gallop.   Pulmonary:      Effort: Pulmonary effort is normal.      Breath sounds: Normal breath sounds. No wheezing or rhonchi.   Abdominal:      Palpations: Abdomen is soft.      Tenderness: There is no abdominal tenderness. There is no right CVA tenderness or left CVA tenderness.   Skin:     General: Skin is warm and dry.   Neurological:      Mental Status: She is alert and oriented to person, place, and time.      Cranial Nerves: No cranial nerve deficit.   Psychiatric:         Mood and Affect: Mood and affect normal.         Behavior: Behavior normal.         " Thought Content: Thought content normal.         Judgment: Judgment normal.        Result Review :                Assessment and Plan   Diagnoses and all orders for this visit:    1. Dysuria (Primary)  Assessment & Plan:  Nitrite + in office on urinalysis, will treat empirically for UTI with Bactrim and send urine for culture.  If symptoms persist or worsen, especially if patient develops fevers, hematuria or low back/pelvic pain.  Call for any questions or concerns.    Orders:  -     Urinalysis With Culture If Indicated - Urine, Clean Catch  -     Urinalysis, Microscopic Only - Urine, Clean Catch  -     Urine Culture - Urine, Urine, Clean Catch    Other orders  -     sulfamethoxazole-trimethoprim (Bactrim DS) 800-160 MG per tablet; Take 1 tablet by mouth 2 (Two) Times a Day for 10 days.  Dispense: 20 tablet; Refill: 0             Follow Up   No follow-ups on file.  Patient was given instructions and counseling regarding her condition or for health maintenance advice. Please see specific information pulled into the AVS if appropriate.

## 2024-10-01 LAB — BACTERIA SPEC AEROBE CULT: NO GROWTH

## 2025-07-07 ENCOUNTER — OFFICE VISIT (OUTPATIENT)
Dept: INTERNAL MEDICINE | Facility: CLINIC | Age: 50
End: 2025-07-07
Payer: COMMERCIAL

## 2025-07-07 VITALS
HEART RATE: 91 BPM | SYSTOLIC BLOOD PRESSURE: 136 MMHG | OXYGEN SATURATION: 99 % | BODY MASS INDEX: 23.6 KG/M2 | WEIGHT: 120.2 LBS | DIASTOLIC BLOOD PRESSURE: 88 MMHG | TEMPERATURE: 98.8 F | HEIGHT: 60 IN | RESPIRATION RATE: 16 BRPM

## 2025-07-07 DIAGNOSIS — N95.1 PERIMENOPAUSE: ICD-10-CM

## 2025-07-07 DIAGNOSIS — R21 RASH AND NONSPECIFIC SKIN ERUPTION: Primary | ICD-10-CM

## 2025-07-07 PROCEDURE — 99213 OFFICE O/P EST LOW 20 MIN: CPT | Performed by: PHYSICIAN ASSISTANT

## 2025-07-07 RX ORDER — CLOTRIMAZOLE 1 %
1 CREAM (GRAM) TOPICAL 2 TIMES DAILY
Qty: 60 G | Refills: 1 | Status: SHIPPED | OUTPATIENT
Start: 2025-07-07

## 2025-07-07 RX ORDER — FLUCONAZOLE 150 MG/1
150 TABLET ORAL WEEKLY
Qty: 2 TABLET | Refills: 0 | Status: SHIPPED | OUTPATIENT
Start: 2025-07-07

## 2025-07-07 NOTE — ASSESSMENT & PLAN NOTE
Will send to GYN for cervical cancer screening and hormone replacement discussion.  Offered antidepressant and blood work for fatigue but patient wants to hold off at this time.

## 2025-07-07 NOTE — ASSESSMENT & PLAN NOTE
Concerning for tinea pedis, will treat with topical antifungal and oral antifungal.  If symptoms persist or worsen over the next couple weeks will place referral to Dermatology.

## 2025-07-07 NOTE — PROGRESS NOTES
"Chief Complaint  Rash (Rash on right foot, underneath foot, in between toes and around both sides. ) and hormones (Would like recommendations for kirsten menopausal, struggling with hormones. )    Subjective          Deepali Carranza presents to [facility]    Rash on right foot- symptoms present for the past couple months.  It seems to be spreading more.  She has used cortisone cream with some improvement with the itching but the rash has persisted.      Perimenopause- most noticeably is having difficulty with fatigue- difficulty functioning, tearfulness, anxiety/depression, brain fog, mood swings, no libido- symptoms present for 2-3 years.  Symptoms typically are worse about 10-14 days prior to her periods which are irregular.  She is interested in exploring options for hormone replacement.     Objective   Vital Signs:   /88 (BP Location: Left arm, Patient Position: Sitting, Cuff Size: Adult)   Pulse 91   Temp 98.8 °F (37.1 °C) (Temporal)   Resp 16   Ht 152.4 cm (60\")   Wt 54.5 kg (120 lb 3.2 oz)   SpO2 99%   BMI 23.47 kg/m²     Physical Exam  Vitals reviewed.   Constitutional:       Appearance: Normal appearance. She is well-developed.   HENT:      Head: Normocephalic and atraumatic.   Eyes:      Conjunctiva/sclera: Conjunctivae normal.      Pupils: Pupils are equal, round, and reactive to light.   Cardiovascular:      Rate and Rhythm: Normal rate and regular rhythm.      Heart sounds: No murmur heard.     No friction rub. No gallop.   Pulmonary:      Effort: Pulmonary effort is normal.      Breath sounds: Normal breath sounds. No wheezing or rhonchi.   Skin:     General: Skin is warm and dry.      Findings: Rash (erythematous patches on top of right foot along sides and between toes) present.   Neurological:      Mental Status: She is alert and oriented to person, place, and time.      Cranial Nerves: No cranial nerve deficit.   Psychiatric:         " Mood and Affect: Mood and affect normal.         Behavior: Behavior normal.         Thought Content: Thought content normal.         Judgment: Judgment normal.        Result Review :          Procedures      Assessment and Plan    Diagnoses and all orders for this visit:    1. Rash and nonspecific skin eruption (Primary)  Assessment & Plan:  Concerning for tinea pedis, will treat with topical antifungal and oral antifungal.  If symptoms persist or worsen over the next couple weeks will place referral to Dermatology.        2. Perimenopause  Assessment & Plan:  Will send to GYN for cervical cancer screening and hormone replacement discussion.  Offered antidepressant and blood work for fatigue but patient wants to hold off at this time.     Orders:  -     Cancel: Ambulatory Referral to Gynecology  -     Ambulatory Referral to Gynecology    Other orders  -     clotrimazole (LOTRIMIN) 1 % cream; Apply 1 Application topically to the appropriate area as directed 2 (Two) Times a Day.  Dispense: 60 g; Refill: 1  -     fluconazole (Diflucan) 150 MG tablet; Take 1 tablet by mouth 1 (One) Time Per Week.  Dispense: 2 tablet; Refill: 0              Follow Up   No follow-ups on file.  Patient was given instructions and counseling regarding her condition or for health maintenance advice. Please see specific information pulled into the AVS if appropriate.

## 2025-07-16 NOTE — PROGRESS NOTES
GYN new patient    CC: perimenopause    Tobacco/Nicotine use:  No, does Vape    HPI:   50 y.o. Contraception or HRT: Contraception:  Vasectomy   Menses increasingly sporadic  History of Present Illness  The patient presents for hormone replacement therapy.    She was referred for hormone replacement therapy due to physical and mental health issues, which she attributes to perimenopause. She has been monitoring her symptoms for the past 2 years. Her menstrual cycles have been irregular for approximately 5 to 6 years, with variations in timing and flow. She reports significant mood swings, feeling euphoric one day and isolated the next. She experiences anxiety and panic attacks, particularly in crowded places or heavy traffic. She also reports brain fog and a lack of interest in activities she previously enjoyed. She has lost interest in sex but does not experience pain during intercourse.  She does describe responsive desire.  She has a history of frequent urinary tract infections and experiences urinary leakage when walking on concrete. She was advised to consider antidepressants during a recent doctor's visit, but she does not believe her current symptoms are related to depression. She has noticed increased hip and knee pain over the past few years, as well as numbness, tingling, and swelling in her hands, particularly after heavy use such as painting. She also experiences redness and heat in her joints when using a mouse.    GYNECOLOGICAL HISTORY:  - Gynecology History discussed  - Cycle length: Irregular  - Frequency and flow: Sporadic, sometimes lasting 2 days, sometimes a week of spotting    CONTRACEPTION:  - Partner has had a vasectomy  - Menopausal status: Perimenopausal    OBSTETRICAL HISTORY:  - Obstetrics History discussed    SOCIAL HISTORY  She does not smoke but vapes.   History: PMHx, Meds, Allergies, PSHx, Social Hx, and POBHx all reviewed and updated.  PCP:Cely Randolph APRN      Review of  "Systems     /75   Pulse 98   Ht 152.4 cm (60\")   Wt 54.4 kg (120 lb)   LMP 06/17/2025 (Approximate) Comment: Irregular menses  Breastfeeding No   BMI 23.44 kg/m²     Physical Exam  Vitals and nursing note reviewed.   Constitutional:       Appearance: Normal appearance.   Cardiovascular:      Rate and Rhythm: Normal rate and regular rhythm.   Pulmonary:      Effort: Pulmonary effort is normal.   Abdominal:      General: Abdomen is flat.      Palpations: Abdomen is soft.   Neurological:      Mental Status: She is alert.         ASSESSMENT AND PLAN:  Problem Visit    Diagnoses and all orders for this visit:    1. Perimenopause (Primary)  -     estradiol (Minivelle) 0.05 MG/24HR patch; Place 1 patch on the skin as directed by provider 2 (Two) Times a Week.  Dispense: 24 patch; Refill: 3  -     Progesterone (Prometrium) 100 MG capsule; Take 1 capsule by mouth Daily.  Dispense: 90 capsule; Refill: 3    2. Genitourinary syndrome of menopause  -     estradiol (ESTRACE VAGINAL) 0.1 MG/GM vaginal cream; Place 0.5 gm PV and massage 0.5 gm to vulva and vestibule at night 2x/week  Dispense: 42.5 g; Refill: 6    3. Osteoporosis screening  -     DEXA Bone Density Axial; Future    4. Decreased libido  Assessment & Plan:  Psychological condition is newly identified.  Medication changes per orders.  Psychological condition  will be reassessed in 3 months.          Assessment & Plan  1. Perimenopause.  - Symptoms include irregular periods, mood swings, anxiety, panic attacks, chest palpitations, joint pain, and decreased libido.  - The potential benefits and risks of hormone therapy were discussed. A bone density test will be conducted due to her petite stature and history of smoking, which increases her risk of bone loss.  - A medium dose of estrogen and progesterone will be started. Vaginal estrogen cream will be applied twice weekly on patch change days, with instructions to massage it into the bottom third of the " vagina and the external genitalia. Progesterone will be taken nightly at bedtime. If her libido does not improve, testosterone therapy may be considered.  -Patient was counseled at length regarding clinical diagnosis of perimenopause and menopause. We discussed the menopause Society 2022 position statement on treating menopause with MHT. We reviewed the WHI from 2002 and the changes and recommendations and guidelines since that study was published. We discussed the use of transdermal estrogen and its significantly decreased risk of VTE. We discussed how bioidentical is a marketing term and transdermal estradiol is iso-molecular to the estradiol we make naturally.  And it is FDA approved.  Prometrium is also iso-molecular and FDA approved.  Progesterone is important to protect the endometrium from unopposed estrogen with an intact uterus      2. Anxiety.  - Significant anxiety, including panic attacks and chest palpitations, was reported.  - The relationship between her anxiety and perimenopause was discussed. Hormone therapy is expected to help alleviate these symptoms.  - If anxiety persists, additional treatments such as exercise, meditation, antidepressants/antianxiety medication, or therapy may be considered.    3. Depression.  - History of depression and current mood swings and feelings of isolation were reported.  - The potential impact of hormone therapy on her mood was discussed.  - If her mental health does not improve with hormone therapy, other interventions such as antidepressants, exercise, or therapy may be considered.    4. Joint pain.  - Joint pain, particularly in her hands, was reported.  - The relationship between joint pain and perimenopause was discussed.  - Hormone therapy is expected to help with this symptom. If joint pain persists, further evaluation for arthritis may be necessary.    5. Decreased libido.  - Lack of sex drive was reported.  - The relationship between decreased libido and  perimenopause was discussed.  - Hormone therapy is expected to help with this symptom. Vaginal estrogen cream will also be used to address any dryness and discomfort. If her libido does not improve, testosterone therapy may be considered.    Follow-up: A follow-up appointment is scheduled for 3 months from now.         Follow Up:  Return in about 3 months (around 10/17/2025).        Kathy Nolen MD  07/17/2025    Patient or patient representative verbalized consent for the use of Ambient Listening during the visit with  Kathy Nolen MD for chart documentation. 7/17/2025  13:58 EDT

## 2025-07-17 ENCOUNTER — OFFICE VISIT (OUTPATIENT)
Dept: OBSTETRICS AND GYNECOLOGY | Age: 50
End: 2025-07-17
Payer: COMMERCIAL

## 2025-07-17 VITALS
DIASTOLIC BLOOD PRESSURE: 75 MMHG | HEART RATE: 98 BPM | HEIGHT: 60 IN | SYSTOLIC BLOOD PRESSURE: 110 MMHG | BODY MASS INDEX: 23.56 KG/M2 | WEIGHT: 120 LBS

## 2025-07-17 DIAGNOSIS — R68.82 DECREASED LIBIDO: ICD-10-CM

## 2025-07-17 DIAGNOSIS — Z13.820 OSTEOPOROSIS SCREENING: ICD-10-CM

## 2025-07-17 DIAGNOSIS — N95.1 PERIMENOPAUSE: Primary | ICD-10-CM

## 2025-07-17 DIAGNOSIS — N95.8 GENITOURINARY SYNDROME OF MENOPAUSE: ICD-10-CM

## 2025-07-17 RX ORDER — ESTRADIOL 0.1 MG/G
CREAM VAGINAL
Qty: 42.5 G | Refills: 6 | Status: SHIPPED | OUTPATIENT
Start: 2025-07-17

## 2025-07-17 RX ORDER — PROGESTERONE 100 MG/1
100 CAPSULE ORAL DAILY
Qty: 90 CAPSULE | Refills: 3 | Status: SHIPPED | OUTPATIENT
Start: 2025-07-17

## 2025-07-17 RX ORDER — ESTRADIOL 0.05 MG/D
1 PATCH, EXTENDED RELEASE TRANSDERMAL 2 TIMES WEEKLY
Qty: 24 PATCH | Refills: 3 | Status: SHIPPED | OUTPATIENT
Start: 2025-07-17

## 2025-07-21 ENCOUNTER — TELEPHONE (OUTPATIENT)
Dept: OBSTETRICS AND GYNECOLOGY | Age: 50
End: 2025-07-21
Payer: COMMERCIAL